# Patient Record
Sex: FEMALE | Race: WHITE | NOT HISPANIC OR LATINO | Employment: FULL TIME | URBAN - METROPOLITAN AREA
[De-identification: names, ages, dates, MRNs, and addresses within clinical notes are randomized per-mention and may not be internally consistent; named-entity substitution may affect disease eponyms.]

---

## 2021-09-26 ENCOUNTER — NURSE TRIAGE (OUTPATIENT)
Dept: NURSING | Facility: CLINIC | Age: 21
End: 2021-09-26

## 2021-09-26 NOTE — TELEPHONE ENCOUNTER
"Caller stubbed left  4th toe last night; no deformity but toe appears swollen and  tender; nail intact without subungual hematoma or break in skin   Triage protocol and home care  reviewed; further  advised  \"dimple taping\"  for added support  Advised to be seen for new or worseninsg  symptoms or if not  resolved in one week   Caller understands and will comply   Jennifer Kim RN  [facility]     Reason for Disposition    Stubbed or jammed toe    Additional Information    Negative: [1] Major bleeding (e.g., spurting blood) AND [2] can't be stopped    Negative: Foot or ankle injury    Negative: Looks infected    Negative: Amputated toe    Negative: Skin is split open or gaping (or length > 1/2 inch or 12 mm)    Negative: [1] Bleeding AND [2] won't stop after 10 minutes of direct pressure (using correct technique)    Negative: [1] Dirt in the wound AND [2] not removed with 15 minutes of scrubbing    Negative: Sounds like a serious injury to the triager    Negative: [1] SEVERE pain AND [2] not improved 2 hours after pain medicine/ice packs    Negative: [1] MODERATE-SEVERE pain AND [2] blood present under the toenail    Negative: Looks like a broken bone (e.g., crooked or deformed)    Negative: Looks like a dislocated joint (e.g., crooked or deformed)    Negative: Toenail is completely torn off (toenail avulsion)    Negative: Base of toenail has popped out of the skin fold (nail base dislocation)    Negative: [1] Toe injury AND [2] bad limp or can't wear shoes/sandals    Negative: [1] No prior tetanus shots (or is not fully vaccinated) AND [2] any wound (e.g., cut, scrape)    Negative: [1] HIV positive or severe immunodeficiency (severely weak immune system) AND [2] DIRTY cut or scrape    Negative: [1] Last tetanus shot > 5 years ago AND [2] DIRTY cut or scrape    Negative: [1] Last tetanus shot > 10 years ago AND [2] CLEAN cut or scrape (e.g., object AND skin were clean)    Negative: [1] Has diabetes (diabetes mellitus) AND " [2] small cut or scrape    Negative: Large swelling or bruise    Negative: [1] Toenail comes off or is almost off AND [2] follows old injury AND [3] wants doctor to remove it    Negative: [1] After 3 days AND [2] pain not improved    Negative: [1] After 1 week AND [2] not using the toe normally    Protocols used: TOE INJURY-A-AH